# Patient Record
Sex: MALE | URBAN - METROPOLITAN AREA
[De-identification: names, ages, dates, MRNs, and addresses within clinical notes are randomized per-mention and may not be internally consistent; named-entity substitution may affect disease eponyms.]

---

## 2020-11-06 ENCOUNTER — OFFICE VISIT (OUTPATIENT)
Dept: FAMILY MEDICINE | Facility: CLINIC | Age: 35
End: 2020-11-06
Payer: COMMERCIAL

## 2020-11-06 VITALS
HEART RATE: 66 BPM | OXYGEN SATURATION: 98 % | HEIGHT: 64 IN | SYSTOLIC BLOOD PRESSURE: 94 MMHG | BODY MASS INDEX: 23.08 KG/M2 | DIASTOLIC BLOOD PRESSURE: 68 MMHG | WEIGHT: 135.2 LBS | TEMPERATURE: 97.6 F

## 2020-11-06 DIAGNOSIS — R10.13 DYSPEPSIA: Primary | ICD-10-CM

## 2020-11-06 PROCEDURE — 99203 OFFICE O/P NEW LOW 30 MIN: CPT | Performed by: FAMILY MEDICINE

## 2020-11-06 RX ORDER — MULTIPLE VITAMINS W/ MINERALS TAB 9MG-400MCG
1 TAB ORAL DAILY
COMMUNITY

## 2020-11-06 ASSESSMENT — MIFFLIN-ST. JEOR: SCORE: 1459.26

## 2020-11-06 NOTE — PATIENT INSTRUCTIONS
ASSESSMENT:   (R10.13) Dyspepsia  (primary encounter diagnosis)  Comment:   Plan: omeprazole (PRILOSEC) 20 MG DR capsule          Gastroesophageal reflux (GERD) lifestyle changes that can help improve symptoms include:  Eating smaller meals and not lying down after meals  Elevate head of bed with 6-8 inch blocks or a wedge pillow.   Avoid lying flat on back after eating and at bedtime  Avoid tight fitting clothing  Avoid reflux inducing foods like fat, caffeine, chocolate, carbonated beverages  Maintain an ideal body weight  Avoid alcohol and tobacco  Avoid medications like ibuprofen, naproxen and aspirin    Take omeprazole 20mg daily for 4-6 weeks.   If not better within 2 weeks let me know.    If doing better, take the omeprazole for a month and try stopping.

## 2020-11-06 NOTE — PROGRESS NOTES
"Subjective     Zita Rojas is a 35 year old male who presents to clinic today for the following health issues:    Chief Complaint   Patient presents with     Abdominal Pain      He has stomach pain.  Not sure what it is.   Present in the AM and feels like hunger pain.   Sometimes after lifting.   Location: epigastric.    Midline.   No waterbrash.  Sometimes pain up into chest.   Pain comes and goes.   Alleviating factors: food, yogurt.   Little nausea.   Caffeine:1-2 cups a day.  Infrequent pop.   Alcohol:three times a week up to 5 beers.     Abdominal/Flank Pain  Onset/Duration: 5 days   Description:   Character: Hunger pain    Location: epigastric region  Radiation: None  Intensity: 6/10 when the episodes occur  Progression of Symptoms:  waxing and waning  Accompanying Signs & Symptoms:  Fever/Chills: freeman but has had body aches.   Gas/Bloating: YES- little bit of gas  Nausea: YES  Vomitting: no  Diarrhea: no  Constipation: no  Dysuria or Hematuria: no  History:   Trauma: no  Previous similar pain: no  Previous tests done: none  Does a lot of lifting at work and this aggravates the pain.   Precipitating factors:   Does the pain change with:     Food: YES- it subsides with food     Bowel Movement: Unknown     Urination: Unknown    Other factors:  no  Therapies tried and outcome: None      Occupation: [employer].    There is no problem list on file for this patient.   No chronic illness.   No meds.   Takes occasional ibuprofen.       ROS:General: POSITIVE for:, some fatigue recently.   Resp: No coughing, wheezing or shortness of breath  : No urinary frequency or dysuria, bladder or kidney problems  Musculoskeletal: No significant muscle or joint pains    OBJECTIVE:Blood pressure 94/68, pulse 66, temperature 97.6  F (36.4  C), temperature source Tympanic, height 1.626 m (5' 4\"), weight 61.3 kg (135 lb 3.2 oz), SpO2 98 %. BMI=Body mass index is 23.21 kg/m .  GENERAL APPEARANCE ADULT: Alert, no acute " distress  NECK: No adenopathy,masses or thyromegaly  CV: normal rate, regular rhythm, no murmur or gallop  ABDOMEN: soft, no organomegaly or masses , minimal tenderness epigastrium and left upper quadrant.     ASSESSMENT:   (R10.13) Dyspepsia  (primary encounter diagnosis)  Comment:   Plan: omeprazole (PRILOSEC) 20 MG DR capsule          Gastroesophageal reflux (GERD) lifestyle changes that can help improve symptoms include:  Eating smaller meals and not lying down after meals  Elevate head of bed with 6-8 inch blocks or a wedge pillow.   Avoid lying flat on back after eating and at bedtime  Avoid tight fitting clothing  Avoid reflux inducing foods like fat, caffeine, chocolate, carbonated beverages  Maintain an ideal body weight  Avoid alcohol and tobacco  Avoid medications like ibuprofen, naproxen and aspirin    Take omeprazole 20mg daily for 4-6 weeks.   If not better within 2 weeks let me know.    If doing better, take the omeprazole for a month and try stopping.

## 2020-11-06 NOTE — NURSING NOTE
"Chief Complaint   Patient presents with     Abdominal Pain       Initial BP 94/68   Pulse 66   Temp 97.6  F (36.4  C) (Tympanic)   Ht 1.626 m (5' 4\")   Wt 61.3 kg (135 lb 3.2 oz)   SpO2 98%   BMI 23.21 kg/m   Estimated body mass index is 23.21 kg/m  as calculated from the following:    Height as of this encounter: 1.626 m (5' 4\").    Weight as of this encounter: 61.3 kg (135 lb 3.2 oz).    Patient presents to the clinic using No DME    Health Maintenance that is potentially due pending provider review:  Flu shot    Pt declines to have flu shot.    Is there anyone who you would like to be able to receive your results? No  If yes have patient fill out LYNN      "